# Patient Record
Sex: FEMALE | Race: BLACK OR AFRICAN AMERICAN | Employment: UNEMPLOYED | URBAN - METROPOLITAN AREA
[De-identification: names, ages, dates, MRNs, and addresses within clinical notes are randomized per-mention and may not be internally consistent; named-entity substitution may affect disease eponyms.]

---

## 2018-01-17 ENCOUNTER — OFFICE VISIT (OUTPATIENT)
Dept: OBGYN CLINIC | Age: 28
End: 2018-01-17

## 2018-01-17 VITALS
HEIGHT: 65 IN | BODY MASS INDEX: 28.32 KG/M2 | WEIGHT: 170 LBS | DIASTOLIC BLOOD PRESSURE: 104 MMHG | HEART RATE: 76 BPM | SYSTOLIC BLOOD PRESSURE: 151 MMHG

## 2018-01-17 DIAGNOSIS — Z11.3 SCREENING EXAMINATION FOR STD (SEXUALLY TRANSMITTED DISEASE): ICD-10-CM

## 2018-01-19 NOTE — PROGRESS NOTES
History of Present Illness     Alva Andujar is a 28 y.o. female who presents for STD screening. Current symptoms are: none. She does not experience abdominal discomfort. She does not experience burning with urination. She denies genital lesions at this time. STD exposure: admits to knowledge of risky exposure. HIV Status: the patient is HIV negative. Contraception: none    There is no problem list on file for this patient. No Known Allergies  Past Medical History:   Diagnosis Date    Abnormal Papanicolaou smear of cervix      Past Surgical History:   Procedure Laterality Date    HX COLPOSCOPY       No family history on file. Social History   Substance Use Topics    Smoking status: Current Every Day Smoker    Smokeless tobacco: Never Used    Alcohol use Yes        Review of Systems  Pertinent items are noted in HPI. Physical Exam     Visit Vitals    BP (!) 151/104    Pulse 76    Ht 5' 5\" (1.651 m)    Wt 170 lb (77.1 kg)    LMP 04/14/2017    BMI 28.29 kg/m2     General:   alert, cooperative, no distress, appears stated age               Pelvic:     Vulva: normal    Vagina:  normal mucosa, normal discharge    Cervix: no lesions    Uterus: normal size    Adnexa: Normal adnexa, No mass, fullness, tenderness     gc/ct sent. Assessment/Plan:       ICD-10-CM ICD-9-CM    1. Screening examination for STD (sexually transmitted disease) Z11.3 V74.5 AMB POC URINE PREGNANCY TEST, VISUAL COLOR COMPARISON      T PALLIDUM AB      HEP B SURFACE AG      HEPATITIS C AB      HIV 1/2 AG/AB, 4TH GENERATION,W RFLX CONFIRM      PAP IG, CT-NG, RFX HPV UDZM([id number], [id number])      CHLAMYDIA/NEISSERIA/TRICHOMONAS AMP   .

## 2018-01-22 DIAGNOSIS — A59.01 TRICHOMONAL VAGINITIS: Primary | ICD-10-CM

## 2018-01-22 LAB
C TRACH RRNA SPEC QL NAA+PROBE: NEGATIVE
N GONORRHOEA RRNA SPEC QL NAA+PROBE: NEGATIVE
SPECIMEN STATUS REPORT, ROLRST: NORMAL
T VAGINALIS RRNA SPEC QL NAA+PROBE: POSITIVE

## 2018-01-22 RX ORDER — METRONIDAZOLE 500 MG/1
2000 TABLET ORAL ONCE
Qty: 4 TAB | Refills: 0 | Status: SHIPPED | OUTPATIENT
Start: 2018-01-22 | End: 2018-01-22

## 2018-01-24 NOTE — PROGRESS NOTES
Patient notified and voices understandin. Positive richomonas,  2. Pharmacy info was updated. Rx to be sent to preferred pharmacy on file. 3. Advised to abstain x7 days once pt and partner completes treatment.

## 2018-01-25 RX ORDER — METRONIDAZOLE 500 MG/1
2000 TABLET ORAL
Qty: 4 TAB | Refills: 0 | Status: SHIPPED | OUTPATIENT
Start: 2018-01-25 | End: 2018-01-25

## 2018-06-06 ENCOUNTER — HOSPITAL ENCOUNTER (EMERGENCY)
Age: 28
Discharge: HOME | End: 2018-06-06
Attending: EMERGENCY MEDICINE
Payer: SELF-PAY

## 2018-06-06 VITALS
RESPIRATION RATE: 16 BRPM | WEIGHT: 170 LBS | HEIGHT: 63 IN | SYSTOLIC BLOOD PRESSURE: 144 MMHG | BODY MASS INDEX: 30.12 KG/M2 | HEART RATE: 100 BPM | OXYGEN SATURATION: 100 % | TEMPERATURE: 98.3 F | DIASTOLIC BLOOD PRESSURE: 106 MMHG

## 2018-06-06 DIAGNOSIS — L02.91 ABSCESS: Primary | ICD-10-CM

## 2018-06-06 PROCEDURE — 75810000289 HC I&D ABSCESS SIMP/COMP/MULT

## 2018-06-06 PROCEDURE — 99282 EMERGENCY DEPT VISIT SF MDM: CPT

## 2018-06-06 NOTE — DISCHARGE INSTRUCTIONS

## 2018-06-06 NOTE — ED PROVIDER NOTES
[facility] EMERGENCY DEPT      10:43 AM    Date: 6/6/2018  Patient Name: Tomas Moss    History of Presenting Illness     Chief Complaint   Patient presents with    Skin Problem     History Provided By: Patient    Chief Complaint: abscess   Duration: 3 Days  Timing:  Worsening  Location: left leg/groin   Quality: Aching  Severity: Moderate  Modifying Factors: worse with pressure/movement of her leg   Associated Symptoms: denies any other associated signs or symptoms    28 y.o. female presents to the emergency department c/o an abscess to her left groin since 3 days ago. Pt states she gets these every so often, has never had one drained. States it is much larger than prior abscesses. Denies drainage, redness, warmth, or other symptoms at this time. No other complaints. Nursing notes regarding the HPI and triage nursing notes were reviewed. Prior medical records were reviewed. Past History     Past Medical History:  Past Medical History:   Diagnosis Date    Abnormal Papanicolaou smear of cervix        Past Surgical History:  Past Surgical History:   Procedure Laterality Date    HX COLPOSCOPY         Family History:  History reviewed. No pertinent family history. Social History:  Social History   Substance Use Topics    Smoking status: Current Every Day Smoker     Packs/day: 0.50    Smokeless tobacco: Never Used    Alcohol use Yes       Allergies:  No Known Allergies    Patient's primary care provider (as noted in EPIC):  None    Review of Systems   Constitutional:  Denies malaise, fever, chills. Extremity/MS:  Denies injury or pain. Neuro:  Denies neurologic symptoms/deficits/paresthesias. Skin: + abscess to left groin. All other systems negative as reviewed.      Visit Vitals    BP (!) 144/106 (BP 1 Location: Right arm, BP Patient Position: At rest;Sitting)    Pulse 100    Temp 98.3 °F (36.8 °C)    Resp 16    Ht 5' 3\" (1.6 m)    Wt 77.1 kg (170 lb)    SpO2 100%    BMI 30.11 kg/m2       Patient Vitals for the past 12 hrs:   Temp Pulse Resp BP SpO2   06/06/18 1018 - 100 - - -   06/06/18 0933 98.3 °F (36.8 °C) (!) 111 16 (!) 144/106 100 %       PHYSICAL EXAM:    CONSTITUTIONAL:  Alert, in no apparent distress;  well developed;  well nourished. HEAD:  Normocephalic, atraumatic. EYES:  EOMI. Non-icteric sclera. Normal conjunctiva. ENTM:  Mouth: mucous membranes moist.  NECK:  Supple  RESPIRATORY:  Chest clear, equal breath sounds, good air movement. Without wheezes, rhonchi or rales. CARDIOVASCULAR:  Regular rate and rhythm. No murmurs, rubs, or gallops. UPPER EXT:  Normal inspection. LOWER EXT:  See skin; NVI distally. NEURO:  Moves all four extremities, and grossly normal motor exam.  SKIN:  Left lateral labia majora with 3cm region of edema and fluctuance; left medial superior thigh with 1x2cm region of edema and fluctuance. Otherwise normal for age. PSYCH:  Alert and normal affect. Area of abscess:  Left groin     Differential diagnoses:  Abscess, inflammatory induration, SQ nodule, pilonidal cyst and/or lipoma or a combination of the aforementioned are highest on differential diagnoses. ED COURSE:      I&D [facility]  Date/Time: 6/6/2018 10:52 AM  Performed by: Bibi Menjivar  Authorized by: Bibi Menjivar     Consent:     Consent given by:  Patient    Risks discussed:  Bleeding, incomplete drainage and infection    Alternatives discussed:  No treatment  Location:     Type:  Abscess    Size:  3cm    Location:  Anogenital    Anogenital location:  Perineum  Pre-procedure details:     Skin preparation:  Betadine  Anesthesia (see MAR for exact dosages):      Anesthesia method:  None  Procedure type:     Complexity:  Complex  Procedure details:     Needle aspiration: no      Incision types:  Single straight    Incision depth:  Dermal    Scalpel blade:  11    Wound management:  Probed and deloculated    Drainage:  Bloody and purulent    Drainage amount: Copious    Wound treatment:  Wound left open    Packing materials:  None  Post-procedure details:     Patient tolerance of procedure: Tolerated well, no immediate complications  Comments:      A second abscess on the medial thigh, [id number], adjacent to the first was also drained and probed in the same fashion. IMPRESSION AND MEDICAL DECISION MAKING:  Based upon the patient's presentation with noted HPI and PE, along with the work up done in the emergency department, I believe that the patient is having noted abscess. She will do warm compresses, sitz baths, and f/u with PCP. The patient appears nontoxic at time of discharge. Diagnosis:   1.  Abscess      Disposition: Discharge    Follow-up Information     Follow up With Details Comments [address] [facility] [address], DO In 3 days  [facility]  [address] [place] [id number]  [phone]      [facility] EMERGENCY DEPT  If symptoms worsen [address] [place] [id number]  [address] [facility] [place]